# Patient Record
Sex: FEMALE | Race: BLACK OR AFRICAN AMERICAN | Employment: PART TIME | URBAN - METROPOLITAN AREA
[De-identification: names, ages, dates, MRNs, and addresses within clinical notes are randomized per-mention and may not be internally consistent; named-entity substitution may affect disease eponyms.]

---

## 2023-02-15 ENCOUNTER — HOSPITAL ENCOUNTER (EMERGENCY)
Age: 38
Discharge: HOME | End: 2023-02-15
Attending: EMERGENCY MEDICINE
Payer: COMMERCIAL

## 2023-02-15 VITALS
OXYGEN SATURATION: 98 % | WEIGHT: 172 LBS | RESPIRATION RATE: 18 BRPM | SYSTOLIC BLOOD PRESSURE: 118 MMHG | HEART RATE: 91 BPM | TEMPERATURE: 99 F | BODY MASS INDEX: 28.66 KG/M2 | DIASTOLIC BLOOD PRESSURE: 80 MMHG | HEIGHT: 65 IN

## 2023-02-15 DIAGNOSIS — H66.90 ACUTE OTITIS MEDIA, UNSPECIFIED OTITIS MEDIA TYPE: Primary | ICD-10-CM

## 2023-02-15 PROCEDURE — 99283 EMERGENCY DEPT VISIT LOW MDM: CPT

## 2023-02-15 RX ORDER — AMOXICILLIN 500 MG/1
500 CAPSULE ORAL ONCE
Status: COMPLETED | OUTPATIENT
Start: 2023-02-15 | End: 2023-02-15

## 2023-02-15 RX ORDER — IBUPROFEN 600 MG/1
600 TABLET ORAL ONCE
Status: COMPLETED | OUTPATIENT
Start: 2023-02-15 | End: 2023-02-15

## 2023-02-15 RX ORDER — AMOXICILLIN 500 MG/1
500 TABLET, FILM COATED ORAL 2 TIMES DAILY
Qty: 14 TABLET | Refills: 0 | Status: SHIPPED | OUTPATIENT
Start: 2023-02-15 | End: 2023-02-22

## 2023-03-02 ENCOUNTER — HOSPITAL ENCOUNTER (EMERGENCY)
Age: 38
Discharge: HOME | End: 2023-03-02
Attending: STUDENT IN AN ORGANIZED HEALTH CARE EDUCATION/TRAINING PROGRAM
Payer: COMMERCIAL

## 2023-03-02 VITALS
OXYGEN SATURATION: 98 % | SYSTOLIC BLOOD PRESSURE: 103 MMHG | HEIGHT: 66 IN | BODY MASS INDEX: 28.13 KG/M2 | DIASTOLIC BLOOD PRESSURE: 70 MMHG | HEART RATE: 80 BPM | TEMPERATURE: 98 F | RESPIRATION RATE: 16 BRPM | WEIGHT: 175 LBS

## 2023-03-02 DIAGNOSIS — N76.0 ACUTE VAGINITIS: Primary | ICD-10-CM

## 2023-03-02 LAB — B-HCG UR QL: NEGATIVE

## 2023-03-02 PROCEDURE — 81025 URINE PREGNANCY TEST: CPT

## 2023-03-02 PROCEDURE — 87591 N.GONORRHOEAE DNA AMP PROB: CPT | Performed by: STUDENT IN AN ORGANIZED HEALTH CARE EDUCATION/TRAINING PROGRAM

## 2023-03-02 PROCEDURE — 87491 CHLMYD TRACH DNA AMP PROBE: CPT | Performed by: STUDENT IN AN ORGANIZED HEALTH CARE EDUCATION/TRAINING PROGRAM

## 2023-03-02 PROCEDURE — 87480 CANDIDA DNA DIR PROBE: CPT | Performed by: STUDENT IN AN ORGANIZED HEALTH CARE EDUCATION/TRAINING PROGRAM

## 2023-03-02 PROCEDURE — 87510 GARDNER VAG DNA DIR PROBE: CPT | Performed by: STUDENT IN AN ORGANIZED HEALTH CARE EDUCATION/TRAINING PROGRAM

## 2023-03-02 PROCEDURE — 99283 EMERGENCY DEPT VISIT LOW MDM: CPT | Performed by: STUDENT IN AN ORGANIZED HEALTH CARE EDUCATION/TRAINING PROGRAM

## 2023-03-02 PROCEDURE — 99284 EMERGENCY DEPT VISIT MOD MDM: CPT | Performed by: STUDENT IN AN ORGANIZED HEALTH CARE EDUCATION/TRAINING PROGRAM

## 2023-03-02 PROCEDURE — 87660 TRICHOMONAS VAGIN DIR PROBE: CPT | Performed by: STUDENT IN AN ORGANIZED HEALTH CARE EDUCATION/TRAINING PROGRAM

## 2023-03-02 RX ORDER — FLUCONAZOLE 150 MG/1
150 TABLET ORAL
Qty: 2 TABLET | Refills: 0 | Status: SHIPPED | OUTPATIENT
Start: 2023-03-02

## 2023-03-02 NOTE — ED INITIAL ASSESSMENT (HPI)
Seen here last week and given amoxicillin- finished antibiotic and now having vaginal discharge-- hx of yeast infections with antibiotic use in the past

## 2023-03-03 LAB
C TRACH DNA SPEC QL NAA+PROBE: NEGATIVE
N GONORRHOEA DNA SPEC QL NAA+PROBE: NEGATIVE

## 2023-10-02 DIAGNOSIS — Z98.890 PERSONAL HISTORY OF SURGERY TO HEART AND GREAT VESSELS, PRESENTING HAZARDS TO HEALTH: Primary | ICD-10-CM

## 2023-10-02 DIAGNOSIS — O34.30 CERVICAL INCOMPETENCE, DELIVERED: ICD-10-CM

## 2023-10-02 DIAGNOSIS — O34.32 MATERNAL CARE FOR CERVICAL INCOMPETENCE IN SECOND TRIMESTER: ICD-10-CM

## 2023-10-02 DIAGNOSIS — O09.529 ELDERLY MULTIGRAVIDA WITH ANTEPARTUM CONDITION OR COMPLICATION: ICD-10-CM

## 2023-10-02 DIAGNOSIS — Z87.51 PERSONAL HISTORY OF PRE-TERM LABOR: ICD-10-CM

## 2023-10-03 ENCOUNTER — OFFICE VISIT (OUTPATIENT)
Dept: MATERNAL FETAL MEDICINE | Age: 38
End: 2023-10-03
Attending: OBSTETRICS & GYNECOLOGY

## 2023-10-03 VITALS
HEIGHT: 66 IN | BODY MASS INDEX: 27.8 KG/M2 | SYSTOLIC BLOOD PRESSURE: 116 MMHG | RESPIRATION RATE: 16 BRPM | DIASTOLIC BLOOD PRESSURE: 78 MMHG | WEIGHT: 173 LBS | HEART RATE: 54 BPM

## 2023-10-03 DIAGNOSIS — Z31.69 PRE-CONCEPTION COUNSELING: Primary | ICD-10-CM

## 2023-10-03 PROCEDURE — 99245 OFF/OP CONSLTJ NEW/EST HI 55: CPT | Performed by: OBSTETRICS & GYNECOLOGY

## 2023-10-03 ASSESSMENT — PAIN SCALES - GENERAL: PAINLEVEL: 0

## 2023-10-07 ENCOUNTER — E-ADVICE (OUTPATIENT)
Dept: MATERNAL FETAL MEDICINE | Age: 38
End: 2023-10-07

## 2023-10-13 ENCOUNTER — TELEPHONE (OUTPATIENT)
Dept: MATERNAL FETAL MEDICINE | Age: 38
End: 2023-10-13

## 2023-10-17 DIAGNOSIS — O09.219 PREVIOUS PRETERM DELIVERY, ANTEPARTUM: ICD-10-CM

## 2023-10-17 DIAGNOSIS — Z87.42 HISTORY OF CERVICAL INCOMPETENCE: Primary | ICD-10-CM

## 2023-10-17 DIAGNOSIS — Z98.890 HISTORY OF MYOMECTOMY: ICD-10-CM

## 2023-10-17 DIAGNOSIS — Z86.018 HISTORY OF UTERINE FIBROID: ICD-10-CM

## 2023-10-19 ENCOUNTER — HOSPITAL ENCOUNTER (EMERGENCY)
Age: 38
Discharge: HOME | End: 2023-10-19
Attending: EMERGENCY MEDICINE
Payer: COMMERCIAL

## 2023-10-19 VITALS
OXYGEN SATURATION: 98 % | HEART RATE: 66 BPM | SYSTOLIC BLOOD PRESSURE: 104 MMHG | RESPIRATION RATE: 16 BRPM | DIASTOLIC BLOOD PRESSURE: 65 MMHG | BODY MASS INDEX: 28 KG/M2 | WEIGHT: 176.38 LBS | TEMPERATURE: 99 F

## 2023-10-19 DIAGNOSIS — N89.8 VAGINAL DISCHARGE: Primary | ICD-10-CM

## 2023-10-19 LAB — B-HCG UR QL: NEGATIVE

## 2023-10-19 PROCEDURE — 87480 CANDIDA DNA DIR PROBE: CPT | Performed by: EMERGENCY MEDICINE

## 2023-10-19 PROCEDURE — 87510 GARDNER VAG DNA DIR PROBE: CPT | Performed by: EMERGENCY MEDICINE

## 2023-10-19 PROCEDURE — 87591 N.GONORRHOEAE DNA AMP PROB: CPT | Performed by: EMERGENCY MEDICINE

## 2023-10-19 PROCEDURE — 87491 CHLMYD TRACH DNA AMP PROBE: CPT | Performed by: EMERGENCY MEDICINE

## 2023-10-19 PROCEDURE — 81025 URINE PREGNANCY TEST: CPT

## 2023-10-19 PROCEDURE — 87660 TRICHOMONAS VAGIN DIR PROBE: CPT | Performed by: EMERGENCY MEDICINE

## 2023-10-19 PROCEDURE — 99284 EMERGENCY DEPT VISIT MOD MDM: CPT

## 2023-10-19 RX ORDER — FLUCONAZOLE 150 MG/1
150 TABLET ORAL ONCE
Qty: 1 TABLET | Refills: 0 | Status: SHIPPED | OUTPATIENT
Start: 2023-10-19 | End: 2023-10-19

## 2023-10-19 RX ORDER — METRONIDAZOLE 7.5 MG/G
1 GEL VAGINAL NIGHTLY
Qty: 70 G | Refills: 0 | Status: SHIPPED | OUTPATIENT
Start: 2023-10-19 | End: 2023-10-24

## 2023-10-19 NOTE — DISCHARGE INSTRUCTIONS
Take the Diflucan yeast medication  Only start the metronidazole antibiotic if the vaginosis panel shows bacterial infection.   This may not be resulted until tomorrow    Follow-up with your primary care doctor or gynecologist.  Call tomorrow for an appointment to be seen next week to make sure you are doing better  It is possible that a single dose of Diflucan may not be adequate treatment and that repeat treatment or other medications may be necessary

## 2023-10-20 LAB
C TRACH DNA SPEC QL NAA+PROBE: NEGATIVE
N GONORRHOEA DNA SPEC QL NAA+PROBE: NEGATIVE

## 2023-10-22 NOTE — ED NOTES
Pt contacted and notified of positive BV results.  Rx called in to [facility] as requested

## 2023-10-30 ENCOUNTER — OFFICE VISIT (OUTPATIENT)
Dept: MATERNAL FETAL MEDICINE | Age: 38
End: 2023-10-30
Attending: OBSTETRICS & GYNECOLOGY

## 2023-10-30 DIAGNOSIS — N85.2 BULKY OR ENLARGED UTERUS: ICD-10-CM

## 2023-10-30 DIAGNOSIS — O09.219 PREVIOUS PRETERM DELIVERY, ANTEPARTUM: ICD-10-CM

## 2023-10-30 DIAGNOSIS — Z98.890 HISTORY OF MYOMECTOMY: ICD-10-CM

## 2023-10-30 DIAGNOSIS — Z87.42 HISTORY OF CERVICAL INCOMPETENCE: Primary | ICD-10-CM

## 2023-10-30 PROCEDURE — 58340 CATHETER FOR HYSTEROGRAPHY: CPT | Performed by: OBSTETRICS & GYNECOLOGY

## 2023-10-30 PROCEDURE — 76831 ECHO EXAM UTERUS: CPT | Performed by: OBSTETRICS & GYNECOLOGY

## 2023-10-30 PROCEDURE — 76376 3D RENDER W/INTRP POSTPROCES: CPT | Performed by: OBSTETRICS & GYNECOLOGY

## 2023-11-01 ENCOUNTER — E-ADVICE (OUTPATIENT)
Dept: MATERNAL FETAL MEDICINE | Age: 38
End: 2023-11-01

## 2023-11-03 ENCOUNTER — CLINICAL DOCUMENTATION (OUTPATIENT)
Dept: MATERNAL FETAL MEDICINE | Age: 38
End: 2023-11-03

## 2023-11-03 ENCOUNTER — E-ADVICE (OUTPATIENT)
Dept: MATERNAL FETAL MEDICINE | Age: 38
End: 2023-11-03

## 2023-12-13 ENCOUNTER — HOSPITAL ENCOUNTER (EMERGENCY)
Age: 38
Discharge: HOME | End: 2023-12-13
Payer: COMMERCIAL

## 2023-12-13 VITALS
SYSTOLIC BLOOD PRESSURE: 97 MMHG | RESPIRATION RATE: 16 BRPM | WEIGHT: 175 LBS | TEMPERATURE: 98 F | HEART RATE: 55 BPM | OXYGEN SATURATION: 99 % | DIASTOLIC BLOOD PRESSURE: 70 MMHG | BODY MASS INDEX: 29.16 KG/M2 | HEIGHT: 65 IN

## 2023-12-13 DIAGNOSIS — L23.9 ALLERGIC DERMATITIS: Primary | ICD-10-CM

## 2023-12-13 PROCEDURE — 99283 EMERGENCY DEPT VISIT LOW MDM: CPT

## 2023-12-13 NOTE — DISCHARGE INSTRUCTIONS
Keep the skin clean and dry. Use of a mild soap to cleanse, then pat dry. Apply hydrocortisone twice daily to the affected area for the next week. Do use the ointment beyond 1 week. If your symptoms persist follow-up for a recheck.

## 2024-02-23 ENCOUNTER — HOSPITAL ENCOUNTER (EMERGENCY)
Age: 39
Discharge: HOME | End: 2024-02-23

## 2024-02-23 VITALS
DIASTOLIC BLOOD PRESSURE: 68 MMHG | BODY MASS INDEX: 28.13 KG/M2 | HEIGHT: 66 IN | HEART RATE: 58 BPM | TEMPERATURE: 98 F | SYSTOLIC BLOOD PRESSURE: 95 MMHG | WEIGHT: 175 LBS | RESPIRATION RATE: 16 BRPM | OXYGEN SATURATION: 97 %

## 2024-02-23 DIAGNOSIS — N89.8 VAGINAL IRRITATION: Primary | ICD-10-CM

## 2024-02-23 PROCEDURE — 87491 CHLMYD TRACH DNA AMP PROBE: CPT | Performed by: NURSE PRACTITIONER

## 2024-02-23 PROCEDURE — 99283 EMERGENCY DEPT VISIT LOW MDM: CPT

## 2024-02-23 PROCEDURE — 87591 N.GONORRHOEAE DNA AMP PROB: CPT | Performed by: NURSE PRACTITIONER

## 2024-02-23 PROCEDURE — 81514 NFCT DS BV&VAGINITIS DNA ALG: CPT | Performed by: NURSE PRACTITIONER

## 2024-02-23 RX ORDER — METRONIDAZOLE 500 MG/1
500 TABLET ORAL 2 TIMES DAILY
Qty: 14 TABLET | Refills: 0 | Status: SHIPPED | OUTPATIENT
Start: 2024-02-23 | End: 2024-03-01

## 2024-02-23 NOTE — ED PROVIDER NOTES
Patient Seen in: [facility] In [place]      History     Chief Complaint   Patient presents with    Eval-G     Stated Complaint: \"bacterial vaginosis\"  vaginal irriation    Subjective:   HPI      Patient is a pleasant 38-year-old female with history of bacterial vaginosis here for evaluation of 5-day history of vaginal irritation.  Patient states this seems similar to past BV presentations.  Patient states episodes seem to occur prior to onset of menses.  Denies abdominal pain or urinary symptoms.  Patient is sexually active with 1 male partner.  Does not use condoms.  No concern of STI but does request STI testing.      Objective:   History reviewed. No pertinent past medical history.           History reviewed. No pertinent surgical history.             Social History     Socioeconomic History    Marital status:    Tobacco Use    Smoking status: Never    Smokeless tobacco: Never   Vaping Use    Vaping Use: Never used   Substance and Sexual Activity    Alcohol use: Never    Drug use: Never              Review of Systems    Positive for stated complaint: \"bacterial vaginosis\"  vaginal irriation  Other systems are as noted in HPI.  Constitutional and vital signs reviewed.      All other systems reviewed and negative except as noted above.    Physical Exam     ED Triage Vitals [02/23/24 1309]   BP 95/68   Pulse 58   Resp 16   Temp 97.5 °F (36.4 °C)   Temp src Temporal   SpO2 97 %   O2 Device None (Room air)       Current:BP 95/68   Pulse 58   Temp 97.5 °F (36.4 °C) (Temporal)   Resp 16   Ht 167.6 cm (5' 6\")   Wt 79.4 kg   LMP 01/29/2024   SpO2 97%   BMI 28.25 kg/m²         Physical Exam  Vitals and nursing note reviewed.   Constitutional:       General: She is not in acute distress.     Appearance: Normal appearance. She is not ill-appearing, toxic-appearing or diaphoretic.   Eyes:      Pupils: Pupils are equal, round, and reactive to light.   Cardiovascular:      Rate and Rhythm:  Normal rate.      Heart sounds: Normal heart sounds.   Genitourinary:     Vagina: Vaginal discharge present.      Adnexa:         Right: No tenderness.          Left: No tenderness.        Comments: Large amount of thin white discharge noted within vaginal canal.  Neurological:      Mental Status: She is alert.             ED Course     Labs Reviewed   VAGINITIS VAGINOSIS PCR PANEL   CHLAMYDIA/GONOCOCCUS, OWEN                    MDM                                 Medical Decision Making  Differentials include but are not limited to bacterial vaginosis, Candida and GC/CT. vaginal cultures obtained and pending.  Will start empiric Flagyl for suspected bacterial vaginosis.  We discussed starting probiotic and boric acid suppositories.  Close outpatient follow-up with OB/GYN.  Patient agrees with plan of care.  All questions answered to patient's satisfaction.    Amount and/or Complexity of Data Reviewed  Labs: ordered. Decision-making details documented in ED Course.        Disposition and Plan     Clinical Impression:  1. Vaginal irritation         Disposition:  Discharge  2/23/2024  3:00 pm    Follow-up:  obgy    Schedule an appointment as soon as possible for a visit            Medications Prescribed:  Discharge Medication List as of 2/23/2024  3:18 PM        START taking these medications    Details   metRONIDAZOLE 500 MG Oral Tab Take 1 tablet (500 mg total) by mouth in the morning and 1 tablet (500 mg total) before bedtime. Do all this for 7 days., Normal, Disp-14 tablet, R-0

## 2024-02-24 LAB
BV BACTERIA DNA VAG QL NAA+PROBE: NEGATIVE
C GLABRATA DNA VAG QL NAA+PROBE: NEGATIVE
C KRUSEI DNA VAG QL NAA+PROBE: NEGATIVE
CANDIDA DNA VAG QL NAA+PROBE: POSITIVE
T VAGINALIS DNA VAG QL NAA+PROBE: NEGATIVE

## 2024-02-26 LAB
C TRACH DNA SPEC QL NAA+PROBE: NEGATIVE
N GONORRHOEA DNA SPEC QL NAA+PROBE: NEGATIVE

## 2024-02-26 RX ORDER — FLUCONAZOLE 150 MG/1
150 TABLET ORAL ONCE
Qty: 1 TABLET | Refills: 0 | Status: SHIPPED | OUTPATIENT
Start: 2024-02-26 | End: 2024-02-26

## 2024-04-11 ENCOUNTER — HOSPITAL ENCOUNTER (EMERGENCY)
Age: 39
Discharge: HOME | End: 2024-04-11
Attending: EMERGENCY MEDICINE

## 2024-04-11 VITALS
OXYGEN SATURATION: 98 % | TEMPERATURE: 98 F | WEIGHT: 170 LBS | SYSTOLIC BLOOD PRESSURE: 107 MMHG | DIASTOLIC BLOOD PRESSURE: 81 MMHG | HEIGHT: 65 IN | HEART RATE: 81 BPM | BODY MASS INDEX: 28.32 KG/M2 | RESPIRATION RATE: 18 BRPM

## 2024-04-11 DIAGNOSIS — H00.033 EYELID CELLULITIS, RIGHT: Primary | ICD-10-CM

## 2024-04-11 PROCEDURE — 99283 EMERGENCY DEPT VISIT LOW MDM: CPT

## 2024-04-11 PROCEDURE — 99284 EMERGENCY DEPT VISIT MOD MDM: CPT

## 2024-04-11 RX ORDER — POLYMYXIN B SULFATE AND TRIMETHOPRIM 1; 10000 MG/ML; [USP'U]/ML
1 SOLUTION OPHTHALMIC
Qty: 10 ML | Refills: 0 | Status: SHIPPED | OUTPATIENT
Start: 2024-04-11 | End: 2024-04-18

## 2024-04-11 RX ORDER — CEFADROXIL 500 MG/1
500 CAPSULE ORAL 2 TIMES DAILY
Qty: 14 CAPSULE | Refills: 0 | Status: SHIPPED | OUTPATIENT
Start: 2024-04-11 | End: 2024-04-18

## 2024-04-11 NOTE — ED PROVIDER NOTES
Patient Seen in: [facility] In [place]      History     Chief Complaint   Patient presents with    Eye Visual Problem     Stated Complaint: Right eye swelling x3days - pain score 5    Subjective:   HPI    Patient is a 38-year-old female presenting with right upper eyelid swelling, gradually progressing over the last 3 days.  No eyeball pain or redness.  No crusting or drainage.  No visual changes.  No trauma.  She does not wear contacts.    Objective:   History reviewed. No pertinent past medical history.           History reviewed. No pertinent surgical history.             Social History     Socioeconomic History    Marital status:    Tobacco Use    Smoking status: Never     Passive exposure: Never    Smokeless tobacco: Never   Vaping Use    Vaping status: Never Used   Substance and Sexual Activity    Alcohol use: Never    Drug use: Never     Social Determinants of Health     Food Insecurity: No Food Insecurity (7/29/2023)    Received [facility], [facility]    Food Insecurity     Currently or in the past 3 months, have you worried your food would run out before you had money to buy more?: No     In the past 12 months, have you run out of food or been unable to get more?: No   Transportation Needs: No Transportation Needs (7/29/2023)    Received from [facility], [facility]    Transportation Needs     Medical Transportation Needs?: No    Received from [facility], [facility]    Housing Stability              Review of Systems    Positive for stated complaint: Right eye swelling x3days - pain score 5  Other systems are as noted in HPI.  Constitutional and vital signs reviewed.      All other systems reviewed and negative except as noted above.    Physical Exam     ED Triage Vitals [04/11/24 0657]   /80   Pulse 59   Resp 16   Temp 98 °F (36.7 °C)   Temp src Oral    SpO2 99 %   O2 Device None (Room air)       Current:/80   Pulse 59   Temp 98 °F (36.7 °C) (Oral)   Resp 16   Ht 165.1 cm (5' 5\")   Wt 77.1 kg   LMP 03/01/2024 (Approximate)   SpO2 99%   BMI 28.29 kg/m²         Physical Exam  Vitals and nursing note reviewed.   Constitutional:       Appearance: She is well-developed.   HENT:      Head: Normocephalic and atraumatic.   Eyes:      Conjunctiva/sclera: Conjunctivae normal.      Pupils: Pupils are equal, round, and reactive to light.      Comments: There is no conjunctival injection.  There is no foreign body or abrasion noted on fluorescein exam.  Mild diffuse right upper eyelid swelling.  No significant periorbital swelling or erythema.   Cardiovascular:      Rate and Rhythm: Normal rate and regular rhythm.      Heart sounds: Normal heart sounds.   Pulmonary:      Effort: Pulmonary effort is normal.      Breath sounds: Normal breath sounds.   Abdominal:      General: Bowel sounds are normal.      Palpations: Abdomen is soft.   Musculoskeletal:         General: Normal range of motion.   Skin:     General: Skin is warm and dry.   Neurological:      Mental Status: She is alert and oriented to person, place, and time.               ED Course   Labs Reviewed - No data to display                   MDM      38-year-old female presenting with isolated right upper eyelid swelling for the last 3 days.  No trauma of any kind.  No contacts.  There is mild right upper eyelid swelling but there is no corneal abrasion or foreign body noted.  Less likely to be allergic as it is isolated on the right side and not bilateral.  I think this is most consistent with a mild cellulitis.  Does not need labs or imaging.  I recommend oral antibiotics along with antibiotic drops with the patient would prefer to just start with the drops and see if that helps.  Will send a prescription for oral antibiotics and she is going to fill and start taking if symptoms are not  improving.        Past Medical History-none    Differential diagnosis before testing included foreign body, corneal abrasion, periorbital cellulitis, eyelid cellulitis    Co-morbidities that add to the complexity of management include: None    Testing ordered during this visit included none            Disposition:          Discharge  I have discussed with the patient the results of test, differential diagnosis, treatment plan, warning signs and symptoms which should prompt immediate return.  They expressed understanding of these instructions and agrees to the following plan provided.  They were given written discharge instructions and agrees to return for any concerns and voiced understanding and all questions were answered.                               Medical Decision Making      Disposition and Plan     Clinical Impression:  1. Eyelid cellulitis, right         Disposition:  Discharge  4/11/2024  8:23 am    Follow-up:  Starr Valle,   [address]  [address]  [facility] [address]  [phone]    Follow up            Medications Prescribed:  Current Discharge Medication List        START taking these medications    Details   polymyxin B-trimethoprim 57383-5.1 UNIT/ML-% Ophthalmic Solution Apply 1 drop to eye Q3H While Awake for 7 days.  Qty: 10 mL, Refills: 0      cefadroxil 500 MG Oral Cap Take 1 capsule (500 mg total) by mouth 2 (two) times daily for 7 days.  Qty: 14 capsule, Refills: 0

## 2024-04-11 NOTE — DISCHARGE INSTRUCTIONS
Eyedrops as prescribed.  There is a prescription for oral antibiotics sent to the pharmacy as well, if you feel like the swelling is worsening or not getting better in the next 2 to 3 days you should fill that and start taking it.

## 2024-08-02 ENCOUNTER — HOSPITAL ENCOUNTER (EMERGENCY)
Age: 39
Discharge: HOME | End: 2024-08-02
Attending: EMERGENCY MEDICINE
Payer: MEDICAID

## 2024-08-02 VITALS
WEIGHT: 179 LBS | OXYGEN SATURATION: 100 % | TEMPERATURE: 98 F | RESPIRATION RATE: 16 BRPM | DIASTOLIC BLOOD PRESSURE: 69 MMHG | SYSTOLIC BLOOD PRESSURE: 101 MMHG | BODY MASS INDEX: 29.82 KG/M2 | HEART RATE: 70 BPM | HEIGHT: 65 IN

## 2024-08-02 DIAGNOSIS — N89.8 VAGINAL DISCHARGE: Primary | ICD-10-CM

## 2024-08-02 LAB
B-HCG UR QL: POSITIVE
BILIRUB UR QL STRIP.AUTO: NEGATIVE
CLARITY UR REFRACT.AUTO: CLEAR
COLOR UR AUTO: YELLOW
GLUCOSE UR STRIP.AUTO-MCNC: NEGATIVE MG/DL
KETONES UR STRIP.AUTO-MCNC: NEGATIVE MG/DL
LEUKOCYTE ESTERASE UR QL STRIP.AUTO: NEGATIVE
NITRITE UR QL STRIP.AUTO: NEGATIVE
PH UR STRIP.AUTO: 6 [PH] (ref 5–8)
PROT UR STRIP.AUTO-MCNC: NEGATIVE MG/DL
SP GR UR STRIP.AUTO: 1.02 (ref 1–1.03)
UROBILINOGEN UR STRIP.AUTO-MCNC: 0.2 MG/DL

## 2024-08-02 PROCEDURE — 81015 MICROSCOPIC EXAM OF URINE: CPT | Performed by: EMERGENCY MEDICINE

## 2024-08-02 PROCEDURE — 81514 NFCT DS BV&VAGINITIS DNA ALG: CPT | Performed by: EMERGENCY MEDICINE

## 2024-08-02 PROCEDURE — 81001 URINALYSIS AUTO W/SCOPE: CPT | Performed by: EMERGENCY MEDICINE

## 2024-08-02 PROCEDURE — 87591 N.GONORRHOEAE DNA AMP PROB: CPT | Performed by: EMERGENCY MEDICINE

## 2024-08-02 PROCEDURE — 87491 CHLMYD TRACH DNA AMP PROBE: CPT | Performed by: EMERGENCY MEDICINE

## 2024-08-02 PROCEDURE — 81025 URINE PREGNANCY TEST: CPT

## 2024-08-02 PROCEDURE — 99284 EMERGENCY DEPT VISIT MOD MDM: CPT

## 2024-08-02 RX ORDER — VITAMIN A, ASCORBIC ACID, CHOLECALCIFEROL, .ALPHA.-TOCOPHEROL ACETATE, THIAMINE MONONITRATE, RIBOFLAVIN, NIACIN, PYRIDOXINE, FOLIC ACID, CYANOCOBALAMIN, CALCIUM, FERROUS FUMARATE, IODINE, MAGNESIUM, ZINC, AND COPPER 2500; 70; 400; 30; 1.5; 1.6; 17; 12; 1; 12; 200; 15; 150; 100; 15; 2 [IU]/1; MG/1; [IU]/1; [IU]/1; MG/1; MG/1; MG/1; MG/1; MG/1; UG/1; MG/1; MG/1; UG/1; MG/1; MG/1; MG/1
TABLET ORAL
COMMUNITY

## 2024-08-02 NOTE — DISCHARGE INSTRUCTIONS
With your primary care physician, OB follow-up with your own OB or follow-up with OB on-call.  If you have any abdominal pain or fevers return to the emergency room.      You were seen in the emergency room in a limited time.  There is a possibility that although we do not see any acute process at this present time that things can change with time.  Is therefore imperative that you follow-up with primary care physician for close follow-up.  If there is any significant progression of your pain  or other symptoms you to return immediately to the emergency room.

## 2024-08-02 NOTE — ED PROVIDER NOTES
Patient Seen in: [facility] In [place]      History     Chief Complaint   Patient presents with    Eval-G     Stated Complaint: 6 weeks pregnant, wants check for BV    Subjective:   HPI    This is a 38-year-old female who arrives here with complaints of some vaginal discharge.  She noticed it was kind of a yellowish-whitish discharge.  She has no abdominal pain no discomfort no dysuria no fevers no chills.  She states she had BV before to her pregnancy and just wants to be checked for bacterial vaginosis.  The patient has had no fevers or chills or dysuria.  Has no other specific complaints.  Present not having any vaginal bleeding or discharge or abdominal pain or cramping.  Objective:   History reviewed. No pertinent past medical history.           History reviewed. No pertinent surgical history.             Social History     Socioeconomic History    Marital status:    Tobacco Use    Smoking status: Never     Passive exposure: Never    Smokeless tobacco: Never   Vaping Use    Vaping status: Never Used   Substance and Sexual Activity    Alcohol use: Never    Drug use: Never     Social Determinants of Health     Food Insecurity: No Food Insecurity (7/29/2023)    [facility], [facility]    Food Insecurity     Currently or in the past 3 months, have you worried your food would run out before you had money to buy more?: No     In the past 12 months, have you run out of food or been unable to get more?: No   Transportation Needs: No Transportation Needs (7/29/2023)    [facility], [facility]    Transportation Needs     Medical Transportation Needs?: No    Received from [facility], [facility]    Housing Stability              Review of Systems    Positive for stated Chief Complaint: Eval-G    Other systems are as noted in HPI.  Constitutional and  vital signs reviewed.      All other systems reviewed and negative except as noted above.    Physical Exam     ED Triage Vitals [08/02/24 0711]   /69   Pulse 70   Resp 16   Temp 98.3 °F (36.8 °C)   Temp src Oral   SpO2 100 %   O2 Device None (Room air)       Current Vitals:   Vital Signs  BP: 101/69  Pulse: 70  Resp: 16  Temp: 98.3 °F (36.8 °C)  Temp src: Oral    Oxygen Therapy  SpO2: 100 %  O2 Device: None (Room air)            Physical Exam       general: Female in no respiratory distress  The patient is in no respiratory distress    HEENT: There is no signs of trauma.  Oral mucosa is wet.    Lungs: Clear to auscultation without wheezing or retractions  Abdomen is soft nontender there is no masses no rebound no guarding.  Cardiovascular: Regular without murmurs    Extremities: Good pulses bilaterally.      Neuro: Alert and oriented.  The patient is moving all extremities there is no focal findings.  Female no respiratory distress       ED Course     Labs Reviewed   URINALYSIS WITH CULTURE REFLEX - Abnormal; Notable for the following components:       Result Value    Blood Urine Trace-Intact (*)     All other components within normal limits   UA MICROSCOPIC ONLY, URINE - Abnormal; Notable for the following components:    Bacteria Urine 1+ (*)     Squamous Epi. Cells Few (*)     All other components within normal limits   POCT PREGNANCY URINE - Abnormal; Notable for the following components:    POCT Urine Pregnancy Positive (*)     All other components within normal limits   CHLAMYDIA/GONOCOCCUS, OWEN   VAGINITIS VAGINOSIS PCR PANEL           Pelvic exam was done with a female nurse Aide as a chaperone.  There is some mild mucoid discharge.  No cervical motion tenderness no active bleeding noted.    Workup was done to rule out a UTI, bacterial vaginosis GC chlamydia was also ordered.  Urine did not show obvious findings to urinary tract infection.  Nitrates negative leuk esterase but the urine was sent for  culture.  Pregnancy was positive.  Vaginitis probe was ordered.       MDM      Limited ultrasound was done by me which shows a live IUP with fetal heart tones in the 160s.  It was intrauterine pregnancy.  No other adnexal masses noted with my limited ultrasound patient no complaints of abdominal pain or discomfort at this present time she is here just to get a BV check.  Recommend close follow-up with her own OB.                                   Medical Decision Making      Disposition and Plan     Clinical Impression:  1. Vaginal discharge         Disposition:  Discharge  8/2/2024  7:56 am    Follow-up:  Charu Londono MD  [address] [address]  [phone]    Follow up in 2 day(s)            Medications Prescribed:  Current Discharge Medication List

## 2024-08-03 LAB
BV BACTERIA DNA VAG QL NAA+PROBE: NEGATIVE
C GLABRATA DNA VAG QL NAA+PROBE: NEGATIVE
C KRUSEI DNA VAG QL NAA+PROBE: NEGATIVE
CANDIDA DNA VAG QL NAA+PROBE: NEGATIVE
T VAGINALIS DNA VAG QL NAA+PROBE: NEGATIVE

## 2024-08-03 NOTE — ED NOTES
Patient called requesting test results for vaginitis panel, pt was informed test were not resulted at this time.

## 2024-08-05 LAB
C TRACH DNA SPEC QL NAA+PROBE: NEGATIVE
N GONORRHOEA DNA SPEC QL NAA+PROBE: NEGATIVE

## 2024-09-19 ENCOUNTER — HOSPITAL ENCOUNTER (EMERGENCY)
Age: 39
Discharge: HOME | End: 2024-09-19
Attending: EMERGENCY MEDICINE
Payer: MEDICAID

## 2024-09-19 ENCOUNTER — APPOINTMENT (OUTPATIENT)
Dept: ULTRASOUND IMAGING | Age: 39
End: 2024-09-19
Payer: MEDICAID

## 2024-09-19 VITALS
BODY MASS INDEX: 29.66 KG/M2 | RESPIRATION RATE: 17 BRPM | DIASTOLIC BLOOD PRESSURE: 67 MMHG | WEIGHT: 178 LBS | OXYGEN SATURATION: 100 % | TEMPERATURE: 98 F | HEART RATE: 67 BPM | HEIGHT: 65 IN | SYSTOLIC BLOOD PRESSURE: 103 MMHG

## 2024-09-19 DIAGNOSIS — R06.00 NOCTURNAL DYSPNEA: Primary | ICD-10-CM

## 2024-09-19 PROCEDURE — 99284 EMERGENCY DEPT VISIT MOD MDM: CPT

## 2024-09-19 PROCEDURE — 76801 OB US < 14 WKS SINGLE FETUS: CPT

## 2024-09-19 NOTE — ED PROVIDER NOTES
Patient Seen in: [facility] In [place]      History     Chief Complaint   Patient presents with    Pregnancy Issues     Stated Complaint: pregnancy issues - 13 weeks pregnant - tried home doppler did not hear heartton*    Subjective:   HPI    Ms. Bennett, currently 13 weeks pregnant, reported having an abdominal cerclage placed around three weeks ago due to a history of incompetent cervix. This is her fifth pregnancy, with only one successful delivery in the past. She had a vaginal cerclage in a previous pregnancy, which was unsuccessful, resulting in a premature birth at around 20 weeks. Recently, she has been experiencing episodes of shortness of breath during sleep, which have been waking her up. She described the sensation as struggling to breathe, which she also experienced in a previous pregnancy. These episodes have been increasing in severity, with the most severe episode occurring the night before the consultation. Alongside this, she also reported feeling dizzy. However, she denied experiencing these symptoms during the day or when awake. She also denied any chest pain, coughing, swelling in her feet, bleeding, or loss of fluid. She reported having a normal vaginal discharge. She expressed concern about the possibility of sleep apnea and requested a test for the same. She is currently under the care of Dr. Noonan at [facility] but is seeking to switch. She reported having contacted a clinic in the area, but they stopped accepting her insurance in June. She is seeking assistance from a  to help coordinate her care and schedule follow-up appointments.  What prompted her to come here today was she tried to get fetal heart tones with a home Doppler and was unable.    Objective:   History reviewed. No pertinent past medical history.           History reviewed. No pertinent surgical history.             Social History     Socioeconomic History    Marital status:    Tobacco  Use    Smoking status: Never     Passive exposure: Never    Smokeless tobacco: Never   Vaping Use    Vaping status: Never Used   Substance and Sexual Activity    Alcohol use: Never    Drug use: Never     Social Determinants of Health     Financial Resource Strain: Low Risk  (8/22/2024)    Received from [facility]    Overall Financial Resource Strain (CARDIA)     Difficulty of Paying Living Expenses: Not hard at all   Food Insecurity: No Food Insecurity (8/22/2024)    Received from [facility]    Hunger Vital Sign     Worried About Running Out of Food in the Last Year: Never true     Ran Out of Food in the Last Year: Never true   Transportation Needs: No Transportation Needs (8/22/2024)    Received from [facility]    PRAPARE - Transportation     Lack of Transportation (Medical): No     Lack of Transportation (Non-Medical): No   Stress: No Stress Concern Present (8/22/2024)    Received from [facility]    [employer] [place] [employer] - Occupational Stress Questionnaire     Feeling of Stress : Not at all   Housing Stability: Low Risk  (8/22/2024)    Received from [facility]    Housing Stability Vital Sign     Unable to Pay for Housing in the Last Year: No     Number of Places Lived in the Last Year: 1     In the last 12 months, was there a time when you did not have a steady place to sleep or slept in a shelter (including now)?: No              Review of Systems    Positive for stated Chief Complaint: Pregnancy Issues    Other systems are as noted in HPI.  Constitutional and vital signs reviewed.      All other systems reviewed and negative except as noted above.    Physical Exam     ED Triage Vitals [09/19/24 1451]   /66   Pulse 75   Resp 16   Temp 98.5 °F (36.9 °C)   Temp src Temporal   SpO2 98 %   O2 Device None (Room air)       Current Vitals:   Vital Signs  BP: 103/67  Pulse: 67  Resp: 17  Temp: 97.9  °F (36.6 °C)  Temp src: Oral    Oxygen Therapy  SpO2: 100 %  O2 Device: None (Room air)            Physical Exam    General: Alert and oriented x3 in no acute distress.  Cardiovascular: Regular rate and rhythm, no murmurs.  Respiratory: Lungs clear to auscultation.  Abdomen: Soft, gravid, nontender, no rebound or guarding, normal active bowel sounds, no CVA tenderness.  Extremities: No CCE.  Skin: Warm and dry.    ED Course   Labs Reviewed - No data to display        US PREG 1ST TRIMESTER (CPT=76801)    Result Date: 9/19/2024  PROCEDURE:  US PREG 1ST TRIMESTER (CPT=76801)  COMPARISON:  None.  INDICATIONS:  pregnancy issues - 13 weeks pregnant - tried home doppler did not hear hearttones. denies any pain, bleeding or cramping  TECHNIQUE:  Transabdominal pelvic ultrasound examination was performed.  PATIENT STATED HISTORY: (As transcribed by Technologist)  Patient presents to check for fetal viability after tried at home for fetal heart tones with a doppler machine.    FINDINGS:  GESTATIONAL SAC:  Present and normal appearing.  No subchorionic hemorrhage. FETAL POLE:  Present and normal appearing.  Mean crown-rump length of 74 mm. YOLK SAC:  Not seen. CARDIAC ACTIVITY:  Present.  Heart rate of 160 beats per minute UTERUS:  Measuring 15.3 x 9.3 x 10.8 cm  RIGHT OVARY:  Measuring 5.7 x 2.6 x 2.9 cm.  Blood flow demonstrated.  Probable corpus luteal cyst in the right ovary measuring 19 x 14 x 15 mm. LEFT OVARY:  Measuring 4.7 x 2.7 x 2.1 cm.  Blood flow demonstrated with unremarkable appearance. CUL-DE-SAC:  No significant free fluid CLINICAL AGE:  13 weeks 5 days SONOGRAPHIC AGE:  13 weeks 4 days +/-9 days OTHER:  Negative.            CONCLUSION:   1. Single live intrauterine pregnancy with heart rate of 160 beats per minute an estimated gestational age of 13 weeks 4 days.  2. No subchorionic hemorrhage.  3. 19 mm probable corpus luteal cyst in the right ovary.  4. Unremarkable left ovary.  Please see above for further  details.  LOCATION:  [facility]   Dictated by (CST): Catarino Hernández MD on 9/19/2024 at 4:48 PM     Finalized by (CST): Catarino Hernández MD on 9/19/2024 at 4:50 PM             MDM      Patient presents with concern for pregnancy and nocturnal dyspnea.  The patient had an ultrasound that showed a live IUP without evidence of subchorionic hemorrhage.  Fetal heart tones are appropriate.  The patient is not having any pain or loss of fluid.  She has been getting short of breath at night.  I counseled her that there may be multiple reasons for this and offered her more of a workup for the dyspnea but she has declined.  She states she really just wants to try to pursue sleep apnea testing.  I we will give her an appointment for primary care doctor and she will have the 's number to contact them if she has difficulty getting an appointment.  I encouraged her to return if she has any worsening shortness of breath or new symptoms during the day.  She expressed understanding and is comfortable going home at this time.                           Medical Decision Making      Disposition and Plan     Clinical Impression:  1. Nocturnal dyspnea         Disposition:  Discharge  9/19/2024  5:17 pm    Follow-up:  Brenton Mauricio MD  [address]  [facility] [address]  [phone]    Schedule an appointment as soon as possible for a visit  As needed          Medications Prescribed:  Current Discharge Medication List

## 2024-09-19 NOTE — ED INITIAL ASSESSMENT (HPI)
39 y/o F arrives to ED with c/o intermittent feeling of fullness in her abd this morning with deep inspiration when she woke up. Patient reports she has been able to get fetal heart tones at home but was unsuccessful today. Patient reports she has had intermittent trouble catching her breath. Patient denies any bleeding, clots, or cramping. Patient reports she is 13 weeks pregnant; OB through [facility].

## 2024-11-24 ENCOUNTER — HOSPITAL ENCOUNTER (EMERGENCY)
Age: 39
Discharge: HOME | End: 2024-11-24
Payer: MEDICAID

## 2024-11-24 VITALS
OXYGEN SATURATION: 100 % | RESPIRATION RATE: 14 BRPM | TEMPERATURE: 98 F | DIASTOLIC BLOOD PRESSURE: 80 MMHG | HEART RATE: 75 BPM | SYSTOLIC BLOOD PRESSURE: 108 MMHG

## 2024-11-24 DIAGNOSIS — N30.01 ACUTE CYSTITIS WITH HEMATURIA: Primary | ICD-10-CM

## 2024-11-24 LAB
B-HCG UR QL: NEGATIVE
BILIRUB UR QL STRIP.AUTO: NEGATIVE
COLOR UR AUTO: YELLOW
GLUCOSE UR STRIP.AUTO-MCNC: NEGATIVE MG/DL
KETONES UR STRIP.AUTO-MCNC: NEGATIVE MG/DL
NITRITE UR QL STRIP.AUTO: NEGATIVE
PH UR STRIP.AUTO: 8.5 [PH] (ref 5–8)
SP GR UR STRIP.AUTO: 1.02 (ref 1–1.03)
UROBILINOGEN UR STRIP.AUTO-MCNC: 0.2 MG/DL
WBC #/AREA URNS AUTO: >50 /HPF

## 2024-11-24 PROCEDURE — 87186 SC STD MICRODIL/AGAR DIL: CPT

## 2024-11-24 PROCEDURE — 81015 MICROSCOPIC EXAM OF URINE: CPT

## 2024-11-24 PROCEDURE — 99284 EMERGENCY DEPT VISIT MOD MDM: CPT

## 2024-11-24 PROCEDURE — 87086 URINE CULTURE/COLONY COUNT: CPT

## 2024-11-24 PROCEDURE — 87077 CULTURE AEROBIC IDENTIFY: CPT

## 2024-11-24 PROCEDURE — 99283 EMERGENCY DEPT VISIT LOW MDM: CPT

## 2024-11-24 PROCEDURE — 81025 URINE PREGNANCY TEST: CPT

## 2024-11-24 PROCEDURE — 81001 URINALYSIS AUTO W/SCOPE: CPT

## 2024-11-24 RX ORDER — PHENAZOPYRIDINE HYDROCHLORIDE 200 MG/1
200 TABLET, FILM COATED ORAL 3 TIMES DAILY PRN
Qty: 6 TABLET | Refills: 0 | Status: SHIPPED | OUTPATIENT
Start: 2024-11-24 | End: 2024-12-01

## 2024-11-24 RX ORDER — CEFDINIR 300 MG/1
300 CAPSULE ORAL 2 TIMES DAILY
Qty: 14 CAPSULE | Refills: 0 | Status: SHIPPED | OUTPATIENT
Start: 2024-11-24 | End: 2024-12-01

## 2024-11-24 NOTE — ED PROVIDER NOTES
Patient Seen in: [facility] [place]      History     Chief Complaint   Patient presents with    Urinary Symptoms     Stated Complaint: urinary symptoms    Subjective:   The history is provided by the patient.         39-year-old female with no significant past medical history presents to immediate care due to dysuria for the past 4 days.  Cloudy urine today.  Denies any fevers abdominal pain or flank pain.  No gross hematuria.  No abnormal vaginal discharge.  Did have a recent miscarriage last month.  No abnormal vaginal bleeding.  History of UTIs in the past and this feels similar.  History of previous nephrolithiasis or pyelonephritis.  No medications attempted at home.    Objective:     History reviewed. No pertinent past medical history.           History reviewed. No pertinent surgical history.             Social History     Socioeconomic History    Marital status:    Tobacco Use    Smoking status: Never     Passive exposure: Never    Smokeless tobacco: Never   Vaping Use    Vaping status: Never Used   Substance and Sexual Activity    Alcohol use: Never    Drug use: Never     Social Drivers of Health     Financial Resource Strain: Low Risk  (8/22/2024)    Received from [facility]    Overall Financial Resource Strain (CARDIA)     Difficulty of Paying Living Expenses: Not hard at all   Food Insecurity: No Food Insecurity (10/20/2024)    Received from [facility]    Food Insecurity     Currently or in the past 3 months, have you worried your food would run out before you had money to buy more?: No     In the past 12 months, have you run out of food or been unable to get more?: No   Transportation Needs: No Transportation Needs (10/20/2024)    Received from [facility]    Transportation Needs     Medical Transportation Needs?: No   Stress: No Stress Concern Present (8/22/2024)    Received from [facility]     [facility] [place] of Occupational Health - Occupational Stress Questionnaire     Feeling of Stress : Not at all   Housing Stability: Low Risk  (8/22/2024)    Received from [facility]    Housing Stability Vital Sign     Unable to Pay for Housing in the Last Year: No     Number of Places Lived in the Last Year: 1     Unstable Housing in the Last Year: No                  Physical Exam     ED Triage Vitals [11/24/24 1349]   /80   Pulse 75   Resp 14   Temp 97.8 °F (36.6 °C)   Temp src Temporal   SpO2 100 %   O2 Device None (Room air)       Current Vitals:   Vital Signs  BP: 108/80  Pulse: 75  Resp: 14  Temp: 97.8 °F (36.6 °C)  Temp src: Temporal    Oxygen Therapy  SpO2: 100 %  O2 Device: None (Room air)        Physical Exam  Vitals and nursing note reviewed.   Constitutional:       General: She is not in acute distress.     Appearance: Normal appearance.   Cardiovascular:      Rate and Rhythm: Normal rate and regular rhythm.   Pulmonary:      Effort: Pulmonary effort is normal.      Breath sounds: Normal breath sounds.   Abdominal:      General: Bowel sounds are normal. There is no distension.      Palpations: Abdomen is soft.      Tenderness: There is no abdominal tenderness. There is no right CVA tenderness, left CVA tenderness or guarding.   Musculoskeletal:         General: Normal range of motion.   Skin:     General: Skin is warm.   Neurological:      General: No focal deficit present.      Mental Status: She is alert and oriented to person, place, and time.   Psychiatric:         Mood and Affect: Mood normal.         Behavior: Behavior normal.             ED Course     Labs Reviewed   URINALYSIS WITH CULTURE REFLEX - Abnormal; Notable for the following components:       Result Value    Clarity Urine Turbid (*)     Blood Urine Small (*)     pH Urine 8.5 (*)     Protein Urine Trace (*)     Leukocyte Esterase Urine Small (*)     All other components within normal limits   UA MICROSCOPIC  ONLY, URINE - Abnormal; Notable for the following components:    WBC Urine >50 (*)     RBC Urine 6-10 (*)     Bacteria Urine 3+ (*)     Squamous Epi. Cells Few (*)     All other components within normal limits   POCT PREGNANCY URINE - Normal   URINE CULTURE, ROUTINE                   MDM      Ddx-UTI, pyelonephritis, nephrolithiasis, urosepsis.      On exam the patient is afebrile nontoxic.  Vital signs are stable.  Clinically no concern for urosepsis.  Abdomen is soft nontender.   No CVA tenderness.  UA does show greater than 50 white blood cells, 6-10 red blood cells and large burden of bacteria.  Pregnancy testing is negative..  No other acute findings. clincial history and urine dip findings concerning for cystitis clinically have no concern forpyelonephritis nor nephrolithiasis.  Urine culture pending.  Due to the significant urine findings recommended IV antibiotics however patient declines.  States she is feeling well and is always taken oral antibiotics in the past.  Strict ER return precautions were discussed which she voiced understanding will start the patient on cefdinir and Pyridium.  Discussed at length with the patient at home care strict return precautions and importance of close follow-up.  All questions were answered and the patient is comfortable with discharge home.        Medical Decision Making  Problems Addressed:  Acute cystitis with hematuria: acute illness or injury    Amount and/or Complexity of Data Reviewed  Labs: ordered. Decision-making details documented in ED Course.    Risk  OTC drugs.  Prescription drug management.        Disposition and Plan     Clinical Impression:  1. Acute cystitis with hematuria         Disposition:  Discharge  11/24/2024  2:45 pm    Follow-up:  No follow-up provider specified.        Medications Prescribed:  Discharge Medication List as of 11/24/2024  2:47 PM        START taking these medications    Details   cefdinir 300 MG Oral Cap Take 1 capsule (300 mg  total) by mouth 2 (two) times daily for 7 days., Normal, Disp-14 capsule, R-0      phenazopyridine 200 MG Oral Tab Take 1 tablet (200 mg total) by mouth 3 (three) times daily as needed for Pain., Normal, Disp-6 tablet, R-0                 Supplementary Documentation:

## 2024-11-24 NOTE — DISCHARGE INSTRUCTIONS
Due to the significant findings in the urine we did discuss IV antibiotics - If any symptoms worsen please report back to the emergency department.    Continue to increase fluids and rest   take the antibiotics as directed until gone  may use pyridium as needed for pain - will turn your urine orange  we will call you with your culture  follow up with primary care doctor in 48 hours for recheck  return to the Urgent care or return to the Emergency room if symptoms worsen as we discussed such as fevers, back pain, abdominal pain.

## 2025-01-01 ENCOUNTER — HOSPITAL ENCOUNTER (EMERGENCY)
Age: 40
Discharge: HOME | End: 2025-01-01
Attending: EMERGENCY MEDICINE
Payer: MEDICAID

## 2025-01-01 VITALS
SYSTOLIC BLOOD PRESSURE: 94 MMHG | HEIGHT: 65 IN | HEART RATE: 70 BPM | OXYGEN SATURATION: 97 % | BODY MASS INDEX: 29.16 KG/M2 | TEMPERATURE: 98 F | WEIGHT: 175 LBS | DIASTOLIC BLOOD PRESSURE: 66 MMHG | RESPIRATION RATE: 18 BRPM

## 2025-01-01 DIAGNOSIS — R30.0 DYSURIA: Primary | ICD-10-CM

## 2025-01-01 DIAGNOSIS — N89.8 VAGINAL ITCHING: ICD-10-CM

## 2025-01-01 LAB
B-HCG UR QL: NEGATIVE
BILIRUB UR QL STRIP.AUTO: NEGATIVE
CLARITY UR REFRACT.AUTO: CLEAR
COLOR UR AUTO: YELLOW
GLUCOSE UR STRIP.AUTO-MCNC: NEGATIVE MG/DL
KETONES UR STRIP.AUTO-MCNC: 80 MG/DL
LEUKOCYTE ESTERASE UR QL STRIP.AUTO: NEGATIVE
NITRITE UR QL STRIP.AUTO: NEGATIVE
PH UR STRIP.AUTO: 5.5 [PH] (ref 5–8)
PROT UR STRIP.AUTO-MCNC: NEGATIVE MG/DL
SP GR UR STRIP.AUTO: 1.02 (ref 1–1.03)
UROBILINOGEN UR STRIP.AUTO-MCNC: 0.2 MG/DL

## 2025-01-01 PROCEDURE — 99284 EMERGENCY DEPT VISIT MOD MDM: CPT

## 2025-01-01 PROCEDURE — 81514 NFCT DS BV&VAGINITIS DNA ALG: CPT | Performed by: EMERGENCY MEDICINE

## 2025-01-01 PROCEDURE — 81001 URINALYSIS AUTO W/SCOPE: CPT

## 2025-01-01 PROCEDURE — 81025 URINE PREGNANCY TEST: CPT

## 2025-01-01 PROCEDURE — 99283 EMERGENCY DEPT VISIT LOW MDM: CPT

## 2025-01-01 PROCEDURE — 81015 MICROSCOPIC EXAM OF URINE: CPT

## 2025-01-01 PROCEDURE — 81015 MICROSCOPIC EXAM OF URINE: CPT | Performed by: EMERGENCY MEDICINE

## 2025-01-01 RX ORDER — FLUCONAZOLE 150 MG/1
150 TABLET ORAL ONCE
Qty: 1 TABLET | Refills: 0 | Status: SHIPPED | OUTPATIENT
Start: 2025-01-01 | End: 2025-01-01

## 2025-01-01 RX ORDER — CIPROFLOXACIN 250 MG/1
250 TABLET, FILM COATED ORAL 2 TIMES DAILY
Qty: 6 TABLET | Refills: 0 | Status: SHIPPED | OUTPATIENT
Start: 2025-01-01 | End: 2025-01-04

## 2025-01-01 RX ORDER — PHENAZOPYRIDINE HYDROCHLORIDE 200 MG/1
200 TABLET, FILM COATED ORAL 3 TIMES DAILY PRN
Qty: 6 TABLET | Refills: 0 | Status: SHIPPED | OUTPATIENT
Start: 2025-01-01 | End: 2025-01-08

## 2025-01-01 NOTE — ED INITIAL ASSESSMENT (HPI)
Pt was dx w urinary tract infection 3 weeks ago and went away a little w antibiotics now worsening

## 2025-01-01 NOTE — ED PROVIDER NOTES
Patient Seen in: [facility] In [place]      History     Chief Complaint   Patient presents with    Urinary Symptoms     Stated Complaint: patient with UTI, seen here three weeks ago. states symptoms have persisted iraj*    Subjective:   HPI      39-year-old female presents to the emergency department complaining of a few day history of dysuria associated with urinary burning.  She was treated with a cephalosporin for a Klebsiella urinary tract infection a month ago.  There is no history of fever, back pain or vomiting.    Objective:     History reviewed. No pertinent past medical history.           History reviewed. No pertinent surgical history.             Social History     Socioeconomic History    Marital status:    Tobacco Use    Smoking status: Never     Passive exposure: Never    Smokeless tobacco: Never   Vaping Use    Vaping status: Never Used   Substance and Sexual Activity    Alcohol use: Never    Drug use: Never     Social Drivers of Health     Financial Resource Strain: Low Risk  (8/22/2024)    Received from [facility]    Overall Financial Resource Strain (CARDIA)     Difficulty of Paying Living Expenses: Not hard at all   Food Insecurity: No Food Insecurity (10/20/2024)    Received from [facility]    Food Insecurity     Currently or in the past 3 months, have you worried your food would run out before you had money to buy more?: No     In the past 12 months, have you run out of food or been unable to get more?: No   Transportation Needs: No Transportation Needs (10/20/2024)    Received from [facility]    Transportation Needs     Currently or in the past 3 months, has lack of transportation kept you from medical appointments, getting food or medicine, or providing care to a family member?: Unrecognized value     Medical Transportation Needs?: No   Stress: No Stress Concern Present (8/22/2024)    Received from  [facility]    [place] of Occupational Health - Occupational Stress Questionnaire     Feeling of Stress : Not at all   Housing Stability: Low Risk  (8/22/2024)    Received from [facility]    Housing Stability Vital Sign     Unable to Pay for Housing in the Last Year: No     Number of Places Lived in the Last Year: 1     Unstable Housing in the Last Year: No                  Physical Exam     ED Triage Vitals [01/01/25 1312]   BP 94/66   Pulse 70   Resp 18   Temp 97.5 °F (36.4 °C)   Temp src Oral   SpO2 97 %   O2 Device None (Room air)       Current Vitals:   Vital Signs  BP: 94/66  Pulse: 70  Resp: 18  Temp: 97.5 °F (36.4 °C)  Temp src: Oral    Oxygen Therapy  SpO2: 97 %  O2 Device: None (Room air)        Physical Exam     General Appearance: This is a young adult female in no apparent distress.  Vital signs were reviewed per nurses notes.  Patient is afebrile.  HEENT: Normocephalic/atraumatic.  Anicteric sclera.  Oral mucosa is moist.  Oropharynx is normal.  Neck: No adenopathy or thyromegaly.  Lungs are clear to auscultation.  Heart exam: Normal S1-S2 without extra sounds or murmurs.  Regular rate and rhythm.  Abdomen is nontender.  Extremities are atraumatic.  Skin is dry without rashes or lesions.  Neuroexam: Awake, conversive and moving all 4 extremities well.  ED Course     Labs Reviewed   URINALYSIS WITH CULTURE REFLEX - Abnormal; Notable for the following components:       Result Value    Ketones Urine 80.0 (*)     Blood Urine Trace-lysed (*)     WBC Urine 6-10 (*)     RBC Urine 3-5 (*)     Bacteria Urine 2+ (*)     Squamous Epi. Cells Many (*)     Renal Tubular Epithelial Cells Few (*)     All other components within normal limits   UA MICROSCOPIC ONLY, URINE - Abnormal; Notable for the following components:    WBC Urine 6-10 (*)     RBC Urine 3-5 (*)     Bacteria Urine 2+ (*)     Squamous Epi. Cells Many (*)     Renal Tubular Epithelial Cells Few (*)     All  other components within normal limits   POCT PREGNANCY URINE - Normal   VAGINITIS VAGINOSIS PCR PANEL            Urine pregnancy test was negative.  Urinalysis was positive for some white cells but also bacteria and squamous epithelial.  Vaginitis swab was obtained by the patient for vaginitis panel.  Urine culture was sent.    Test results and treatment plan were discussed.  The patient was treated with a 3-day course of a fluoroquinolone for possible urinary tract infection although it appears that it may be just skin contamination.  Patient was also given Pyridium and Diflucan.  Physician follow-up next week.       MDM      #1.  Dysuria.  Possible urinary tract no flank pain or fever to suggest pyelonephritis or kidney stone.  2.  Vaginal itching.  Diflucan dispensed.  Await panel results.        MDM    Disposition and Plan     Clinical Impression:  1. Dysuria    2. Vaginal itching         Disposition:  Discharge  1/1/2025  2:21 pm    Follow-up:  Chanel Coyne MD  [facility] [address] [address] [phone]    Call in 1 day(s)            Medications Prescribed:  Discharge Medication List as of 1/1/2025  2:28 PM        START taking these medications    Details   ciprofloxacin 250 MG Oral Tab Take 1 tablet (250 mg total) by mouth 2 (two) times daily for 3 days., Normal, Disp-6 tablet, R-0      fluconazole 150 MG Oral Tab Take 1 tablet (150 mg total) by mouth once for 1 dose., Normal, Disp-1 tablet, R-0                 Supplementary Documentation:
